# Patient Record
Sex: MALE | Race: OTHER | HISPANIC OR LATINO | URBAN - METROPOLITAN AREA
[De-identification: names, ages, dates, MRNs, and addresses within clinical notes are randomized per-mention and may not be internally consistent; named-entity substitution may affect disease eponyms.]

---

## 2020-02-07 ENCOUNTER — INPATIENT (INPATIENT)
Facility: HOSPITAL | Age: 45
LOS: 2 days | Discharge: HOME | End: 2020-02-10
Attending: SURGERY | Admitting: SURGERY
Payer: MEDICAID

## 2020-02-07 VITALS
SYSTOLIC BLOOD PRESSURE: 175 MMHG | OXYGEN SATURATION: 98 % | RESPIRATION RATE: 18 BRPM | DIASTOLIC BLOOD PRESSURE: 89 MMHG | HEART RATE: 75 BPM | TEMPERATURE: 100 F

## 2020-02-07 LAB
ALBUMIN SERPL ELPH-MCNC: 4.7 G/DL — SIGNIFICANT CHANGE UP (ref 3.5–5.2)
ALP SERPL-CCNC: 92 U/L — SIGNIFICANT CHANGE UP (ref 30–115)
ALT FLD-CCNC: 54 U/L — HIGH (ref 0–41)
ANION GAP SERPL CALC-SCNC: 13 MMOL/L — SIGNIFICANT CHANGE UP (ref 7–14)
APPEARANCE UR: CLEAR — SIGNIFICANT CHANGE UP
APTT BLD: 36.1 SEC — SIGNIFICANT CHANGE UP (ref 27–39.2)
AST SERPL-CCNC: 31 U/L — SIGNIFICANT CHANGE UP (ref 0–41)
BACTERIA # UR AUTO: ABNORMAL
BASOPHILS # BLD AUTO: 0.05 K/UL — SIGNIFICANT CHANGE UP (ref 0–0.2)
BASOPHILS NFR BLD AUTO: 0.5 % — SIGNIFICANT CHANGE UP (ref 0–1)
BILIRUB DIRECT SERPL-MCNC: <0.2 MG/DL — SIGNIFICANT CHANGE UP (ref 0–0.2)
BILIRUB INDIRECT FLD-MCNC: >0.3 MG/DL — SIGNIFICANT CHANGE UP (ref 0.2–1.2)
BILIRUB SERPL-MCNC: 0.5 MG/DL — SIGNIFICANT CHANGE UP (ref 0.2–1.2)
BILIRUB UR-MCNC: NEGATIVE — SIGNIFICANT CHANGE UP
BUN SERPL-MCNC: 13 MG/DL — SIGNIFICANT CHANGE UP (ref 10–20)
CALCIUM SERPL-MCNC: 9.6 MG/DL — SIGNIFICANT CHANGE UP (ref 8.5–10.1)
CHLORIDE SERPL-SCNC: 101 MMOL/L — SIGNIFICANT CHANGE UP (ref 98–110)
CO2 SERPL-SCNC: 25 MMOL/L — SIGNIFICANT CHANGE UP (ref 17–32)
COLOR SPEC: YELLOW — SIGNIFICANT CHANGE UP
CREAT SERPL-MCNC: 0.6 MG/DL — LOW (ref 0.7–1.5)
DIFF PNL FLD: NEGATIVE — SIGNIFICANT CHANGE UP
EOSINOPHIL # BLD AUTO: 0.29 K/UL — SIGNIFICANT CHANGE UP (ref 0–0.7)
EOSINOPHIL NFR BLD AUTO: 2.6 % — SIGNIFICANT CHANGE UP (ref 0–8)
GLUCOSE SERPL-MCNC: 134 MG/DL — HIGH (ref 70–99)
GLUCOSE UR QL: NEGATIVE — SIGNIFICANT CHANGE UP
HCT VFR BLD CALC: 42.3 % — SIGNIFICANT CHANGE UP (ref 42–52)
HGB BLD-MCNC: 14.4 G/DL — SIGNIFICANT CHANGE UP (ref 14–18)
IMM GRANULOCYTES NFR BLD AUTO: 0.2 % — SIGNIFICANT CHANGE UP (ref 0.1–0.3)
INR BLD: 1.05 RATIO — SIGNIFICANT CHANGE UP (ref 0.65–1.3)
KETONES UR-MCNC: ABNORMAL
LACTATE SERPL-SCNC: 1.2 MMOL/L — SIGNIFICANT CHANGE UP (ref 0.7–2)
LEUKOCYTE ESTERASE UR-ACNC: NEGATIVE — SIGNIFICANT CHANGE UP
LIDOCAIN IGE QN: 43 U/L — SIGNIFICANT CHANGE UP (ref 7–60)
LYMPHOCYTES # BLD AUTO: 3.65 K/UL — HIGH (ref 1.2–3.4)
LYMPHOCYTES # BLD AUTO: 33.2 % — SIGNIFICANT CHANGE UP (ref 20.5–51.1)
MCHC RBC-ENTMCNC: 32.4 PG — HIGH (ref 27–31)
MCHC RBC-ENTMCNC: 34 G/DL — SIGNIFICANT CHANGE UP (ref 32–37)
MCV RBC AUTO: 95.1 FL — HIGH (ref 80–94)
MONOCYTES # BLD AUTO: 0.7 K/UL — HIGH (ref 0.1–0.6)
MONOCYTES NFR BLD AUTO: 6.4 % — SIGNIFICANT CHANGE UP (ref 1.7–9.3)
NEUTROPHILS # BLD AUTO: 6.29 K/UL — SIGNIFICANT CHANGE UP (ref 1.4–6.5)
NEUTROPHILS NFR BLD AUTO: 57.1 % — SIGNIFICANT CHANGE UP (ref 42.2–75.2)
NITRITE UR-MCNC: NEGATIVE — SIGNIFICANT CHANGE UP
NRBC # BLD: 0 /100 WBCS — SIGNIFICANT CHANGE UP (ref 0–0)
PH UR: 5.5 — SIGNIFICANT CHANGE UP (ref 5–8)
PLATELET # BLD AUTO: 244 K/UL — SIGNIFICANT CHANGE UP (ref 130–400)
POTASSIUM SERPL-MCNC: 4.4 MMOL/L — SIGNIFICANT CHANGE UP (ref 3.5–5)
POTASSIUM SERPL-SCNC: 4.4 MMOL/L — SIGNIFICANT CHANGE UP (ref 3.5–5)
PROT SERPL-MCNC: 8 G/DL — SIGNIFICANT CHANGE UP (ref 6–8)
PROT UR-MCNC: ABNORMAL
PROTHROM AB SERPL-ACNC: 12.1 SEC — SIGNIFICANT CHANGE UP (ref 9.95–12.87)
RBC # BLD: 4.45 M/UL — LOW (ref 4.7–6.1)
RBC # FLD: 12 % — SIGNIFICANT CHANGE UP (ref 11.5–14.5)
SODIUM SERPL-SCNC: 139 MMOL/L — SIGNIFICANT CHANGE UP (ref 135–146)
SP GR SPEC: 1.02 — SIGNIFICANT CHANGE UP (ref 1.01–1.03)
UROBILINOGEN FLD QL: 0.2 — SIGNIFICANT CHANGE UP (ref 0.2–0.2)
WBC # BLD: 11 K/UL — HIGH (ref 4.8–10.8)
WBC # FLD AUTO: 11 K/UL — HIGH (ref 4.8–10.8)

## 2020-02-07 PROCEDURE — 76705 ECHO EXAM OF ABDOMEN: CPT | Mod: 26

## 2020-02-07 PROCEDURE — 74177 CT ABD & PELVIS W/CONTRAST: CPT | Mod: 26

## 2020-02-07 PROCEDURE — 99285 EMERGENCY DEPT VISIT HI MDM: CPT

## 2020-02-07 PROCEDURE — 93010 ELECTROCARDIOGRAM REPORT: CPT

## 2020-02-07 RX ORDER — SODIUM CHLORIDE 9 MG/ML
1000 INJECTION INTRAMUSCULAR; INTRAVENOUS; SUBCUTANEOUS ONCE
Refills: 0 | Status: COMPLETED | OUTPATIENT
Start: 2020-02-07 | End: 2020-02-07

## 2020-02-07 RX ORDER — MORPHINE SULFATE 50 MG/1
4 CAPSULE, EXTENDED RELEASE ORAL ONCE
Refills: 0 | Status: DISCONTINUED | OUTPATIENT
Start: 2020-02-07 | End: 2020-02-07

## 2020-02-07 RX ORDER — MORPHINE SULFATE 50 MG/1
6 CAPSULE, EXTENDED RELEASE ORAL ONCE
Refills: 0 | Status: DISCONTINUED | OUTPATIENT
Start: 2020-02-07 | End: 2020-02-07

## 2020-02-07 RX ORDER — METRONIDAZOLE 500 MG
500 TABLET ORAL ONCE
Refills: 0 | Status: COMPLETED | OUTPATIENT
Start: 2020-02-07 | End: 2020-02-07

## 2020-02-07 RX ORDER — ONDANSETRON 8 MG/1
4 TABLET, FILM COATED ORAL ONCE
Refills: 0 | Status: COMPLETED | OUTPATIENT
Start: 2020-02-07 | End: 2020-02-07

## 2020-02-07 RX ORDER — CEFTRIAXONE 500 MG/1
1000 INJECTION, POWDER, FOR SOLUTION INTRAMUSCULAR; INTRAVENOUS ONCE
Refills: 0 | Status: COMPLETED | OUTPATIENT
Start: 2020-02-07 | End: 2020-02-07

## 2020-02-07 RX ADMIN — SODIUM CHLORIDE 1000 MILLILITER(S): 9 INJECTION INTRAMUSCULAR; INTRAVENOUS; SUBCUTANEOUS at 19:15

## 2020-02-07 RX ADMIN — Medication 100 MILLIGRAM(S): at 22:11

## 2020-02-07 RX ADMIN — CEFTRIAXONE 100 MILLIGRAM(S): 500 INJECTION, POWDER, FOR SOLUTION INTRAMUSCULAR; INTRAVENOUS at 23:49

## 2020-02-07 RX ADMIN — MORPHINE SULFATE 6 MILLIGRAM(S): 50 CAPSULE, EXTENDED RELEASE ORAL at 21:49

## 2020-02-07 RX ADMIN — ONDANSETRON 4 MILLIGRAM(S): 8 TABLET, FILM COATED ORAL at 19:15

## 2020-02-07 RX ADMIN — MORPHINE SULFATE 4 MILLIGRAM(S): 50 CAPSULE, EXTENDED RELEASE ORAL at 19:15

## 2020-02-07 NOTE — ED PROVIDER NOTE - PROGRESS NOTE DETAILS
I, Dr. Astorga, received signout from Dr. Miguel.  Pending US read for RUQ pain. US read returns showing signs concerning for acute cholecystitis. Contacting surgery Spoke with surgery team numerous times, will awaiting official plan/disposition. AS per surgery team, admit to Dr. Hung surgery floor.

## 2020-02-07 NOTE — ED PROVIDER NOTE - NS ED ROS FT
Constitutional: no fever, chills or generalized weakness  Eyes: no visual changes, no eye pain  ENT: no URI sxs, no throat pain, no change in voice, no excessive drooling, no ear pain  Cardiovascular: no chest pain, no sob, no syncope , no palpitations,  Respiratory: no cough, no shortness of breath  Gastrointestinal: see hpi.   : no urinary sxs, no flank pain.  Musculoskeletal: no msk pain or injury.  Integumentary: no rash or skin changes. no edema  Neurological: no headache, no dizziness, no visual changes, no UE/LE weakness or paresthesias.  Psychiatric: no suicidal or homicidal ideation or attempt. no hallucinations.   Allergic/Immunologic: no lymphadenopathy, no pruritus

## 2020-02-07 NOTE — ED PROVIDER NOTE - ATTENDING CONTRIBUTION TO CARE
45 yo m with no pmh ,presents with 4 days of ruq pain.  pt denies back pain.  no urinary sx.  +n/v.  no diarrhea.  no fever but has chills.  no cp, no sob.  exam: nad, ncat, perrl, eomi, mmm, rrr, ctab, abd soft, ttp ruq, no rebound, mild guarding, nd aox3, imp: pt with ruq pain, labs, us

## 2020-02-07 NOTE — ED PROVIDER NOTE - PHYSICAL EXAMINATION
GENERAL:  well appearing, non-toxic male in no acute distress  SKIN: skin warm, pink and dry. MMM. no rash to abdomen. cap refill < 2 sec  HEAD: NC, AT  EYE: Normal lids, conjunctiva and sclera, PERRL, EOMI  ENT:  Airway intact. Patent oropharynx  NECK: Neck supple. No meningismus, or JVD. Trachea midline  PULM: CTAB. Normal respiratory effort. No respiratory distress. No wheezes, stridor, rales or rhonchi. No retractions  CV: RRR, no M/R/G.   ABD: Soft, non-distended. + RUQ tenderness, + aaron sign.  No rebound or guarding. No CVA tenderness.  MSK: moving all extremities. No edema, erythema, cyanosis. radial pulses equal and intact bilaterally.   NEURO: A+Ox3, no sensory/motor deficits  PSYCH: appropriate behavior, cooperative.

## 2020-02-07 NOTE — ED PROVIDER NOTE - OBJECTIVE STATEMENT
43 yo male with no significant pmh presents to the ED c/o RUQ abd pain x 4 days. Associated with nausea and vomiting. Pain is sharp, constant, nonradiating. no prior abd surgery. Denies fever, chills, chest pain, sob, diarrhea, urinary sxs, flank pain. No alleviating or aggregating factors.

## 2020-02-07 NOTE — ED PROVIDER NOTE - CLINICAL SUMMARY MEDICAL DECISION MAKING FREE TEXT BOX
pw ruq abd pain. Found to have acute cholecystitis. Surgery evaluated. Abx given. Admitted to Dr. Hung.

## 2020-02-07 NOTE — ED ADULT NURSE NOTE - NSIMPLEMENTINTERV_GEN_ALL_ED
Implemented All Universal Safety Interventions:  [place] to call system. Call bell, personal items and telephone within reach. Instruct patient to call for assistance. Room bathroom lighting operational. Non-slip footwear when patient is off stretcher. Physically safe environment: no spills, clutter or unnecessary equipment. Stretcher in lowest position, wheels locked, appropriate side rails in place.

## 2020-02-08 DIAGNOSIS — Z98.890 OTHER SPECIFIED POSTPROCEDURAL STATES: Chronic | ICD-10-CM

## 2020-02-08 LAB
ALBUMIN SERPL ELPH-MCNC: 4.2 G/DL — SIGNIFICANT CHANGE UP (ref 3.5–5.2)
ALBUMIN SERPL ELPH-MCNC: 4.7 G/DL — SIGNIFICANT CHANGE UP (ref 3.5–5.2)
ALP SERPL-CCNC: 71 U/L — SIGNIFICANT CHANGE UP (ref 30–115)
ALP SERPL-CCNC: 82 U/L — SIGNIFICANT CHANGE UP (ref 30–115)
ALT FLD-CCNC: 47 U/L — HIGH (ref 0–41)
ALT FLD-CCNC: 53 U/L — HIGH (ref 0–41)
ANION GAP SERPL CALC-SCNC: 12 MMOL/L — SIGNIFICANT CHANGE UP (ref 7–14)
ANION GAP SERPL CALC-SCNC: 12 MMOL/L — SIGNIFICANT CHANGE UP (ref 7–14)
AST SERPL-CCNC: 30 U/L — SIGNIFICANT CHANGE UP (ref 0–41)
AST SERPL-CCNC: 33 U/L — SIGNIFICANT CHANGE UP (ref 0–41)
BASOPHILS # BLD AUTO: 0.04 K/UL — SIGNIFICANT CHANGE UP (ref 0–0.2)
BASOPHILS # BLD AUTO: 0.04 K/UL — SIGNIFICANT CHANGE UP (ref 0–0.2)
BASOPHILS NFR BLD AUTO: 0.5 % — SIGNIFICANT CHANGE UP (ref 0–1)
BASOPHILS NFR BLD AUTO: 0.7 % — SIGNIFICANT CHANGE UP (ref 0–1)
BILIRUB DIRECT SERPL-MCNC: 0.2 MG/DL — SIGNIFICANT CHANGE UP (ref 0–0.2)
BILIRUB DIRECT SERPL-MCNC: 0.2 MG/DL — SIGNIFICANT CHANGE UP (ref 0–0.2)
BILIRUB INDIRECT FLD-MCNC: 0.5 MG/DL — SIGNIFICANT CHANGE UP (ref 0.2–1.2)
BILIRUB INDIRECT FLD-MCNC: 0.7 MG/DL — SIGNIFICANT CHANGE UP (ref 0.2–1.2)
BILIRUB SERPL-MCNC: 0.7 MG/DL — SIGNIFICANT CHANGE UP (ref 0.2–1.2)
BILIRUB SERPL-MCNC: 0.9 MG/DL — SIGNIFICANT CHANGE UP (ref 0.2–1.2)
BLD GP AB SCN SERPL QL: SIGNIFICANT CHANGE UP
BUN SERPL-MCNC: 10 MG/DL — SIGNIFICANT CHANGE UP (ref 10–20)
BUN SERPL-MCNC: 8 MG/DL — LOW (ref 10–20)
CALCIUM SERPL-MCNC: 9 MG/DL — SIGNIFICANT CHANGE UP (ref 8.5–10.1)
CALCIUM SERPL-MCNC: 9.4 MG/DL — SIGNIFICANT CHANGE UP (ref 8.5–10.1)
CHLORIDE SERPL-SCNC: 101 MMOL/L — SIGNIFICANT CHANGE UP (ref 98–110)
CHLORIDE SERPL-SCNC: 102 MMOL/L — SIGNIFICANT CHANGE UP (ref 98–110)
CO2 SERPL-SCNC: 26 MMOL/L — SIGNIFICANT CHANGE UP (ref 17–32)
CO2 SERPL-SCNC: 27 MMOL/L — SIGNIFICANT CHANGE UP (ref 17–32)
CREAT SERPL-MCNC: 0.5 MG/DL — LOW (ref 0.7–1.5)
CREAT SERPL-MCNC: 0.6 MG/DL — LOW (ref 0.7–1.5)
EOSINOPHIL # BLD AUTO: 0.23 K/UL — SIGNIFICANT CHANGE UP (ref 0–0.7)
EOSINOPHIL # BLD AUTO: 0.35 K/UL — SIGNIFICANT CHANGE UP (ref 0–0.7)
EOSINOPHIL NFR BLD AUTO: 2.7 % — SIGNIFICANT CHANGE UP (ref 0–8)
EOSINOPHIL NFR BLD AUTO: 6 % — SIGNIFICANT CHANGE UP (ref 0–8)
GLUCOSE SERPL-MCNC: 111 MG/DL — HIGH (ref 70–99)
GLUCOSE SERPL-MCNC: 129 MG/DL — HIGH (ref 70–99)
HCT VFR BLD CALC: 40.1 % — LOW (ref 42–52)
HCT VFR BLD CALC: 42 % — SIGNIFICANT CHANGE UP (ref 42–52)
HGB BLD-MCNC: 13.8 G/DL — LOW (ref 14–18)
HGB BLD-MCNC: 14.4 G/DL — SIGNIFICANT CHANGE UP (ref 14–18)
IMM GRANULOCYTES NFR BLD AUTO: 0.2 % — SIGNIFICANT CHANGE UP (ref 0.1–0.3)
IMM GRANULOCYTES NFR BLD AUTO: 0.2 % — SIGNIFICANT CHANGE UP (ref 0.1–0.3)
LYMPHOCYTES # BLD AUTO: 2.48 K/UL — SIGNIFICANT CHANGE UP (ref 1.2–3.4)
LYMPHOCYTES # BLD AUTO: 2.67 K/UL — SIGNIFICANT CHANGE UP (ref 1.2–3.4)
LYMPHOCYTES # BLD AUTO: 28.8 % — SIGNIFICANT CHANGE UP (ref 20.5–51.1)
LYMPHOCYTES # BLD AUTO: 45.6 % — SIGNIFICANT CHANGE UP (ref 20.5–51.1)
MAGNESIUM SERPL-MCNC: 2 MG/DL — SIGNIFICANT CHANGE UP (ref 1.8–2.4)
MAGNESIUM SERPL-MCNC: 2.1 MG/DL — SIGNIFICANT CHANGE UP (ref 1.8–2.4)
MCHC RBC-ENTMCNC: 32.2 PG — HIGH (ref 27–31)
MCHC RBC-ENTMCNC: 32.4 PG — HIGH (ref 27–31)
MCHC RBC-ENTMCNC: 34.3 G/DL — SIGNIFICANT CHANGE UP (ref 32–37)
MCHC RBC-ENTMCNC: 34.4 G/DL — SIGNIFICANT CHANGE UP (ref 32–37)
MCV RBC AUTO: 93.5 FL — SIGNIFICANT CHANGE UP (ref 80–94)
MCV RBC AUTO: 94.6 FL — HIGH (ref 80–94)
MONOCYTES # BLD AUTO: 0.48 K/UL — SIGNIFICANT CHANGE UP (ref 0.1–0.6)
MONOCYTES # BLD AUTO: 0.56 K/UL — SIGNIFICANT CHANGE UP (ref 0.1–0.6)
MONOCYTES NFR BLD AUTO: 6.5 % — SIGNIFICANT CHANGE UP (ref 1.7–9.3)
MONOCYTES NFR BLD AUTO: 8.2 % — SIGNIFICANT CHANGE UP (ref 1.7–9.3)
NEUTROPHILS # BLD AUTO: 2.3 K/UL — SIGNIFICANT CHANGE UP (ref 1.4–6.5)
NEUTROPHILS # BLD AUTO: 5.29 K/UL — SIGNIFICANT CHANGE UP (ref 1.4–6.5)
NEUTROPHILS NFR BLD AUTO: 39.3 % — LOW (ref 42.2–75.2)
NEUTROPHILS NFR BLD AUTO: 61.3 % — SIGNIFICANT CHANGE UP (ref 42.2–75.2)
NRBC # BLD: 0 /100 WBCS — SIGNIFICANT CHANGE UP (ref 0–0)
NRBC # BLD: 0 /100 WBCS — SIGNIFICANT CHANGE UP (ref 0–0)
PHOSPHATE SERPL-MCNC: 4.5 MG/DL — SIGNIFICANT CHANGE UP (ref 2.1–4.9)
PHOSPHATE SERPL-MCNC: 4.7 MG/DL — SIGNIFICANT CHANGE UP (ref 2.1–4.9)
PLATELET # BLD AUTO: 222 K/UL — SIGNIFICANT CHANGE UP (ref 130–400)
PLATELET # BLD AUTO: 226 K/UL — SIGNIFICANT CHANGE UP (ref 130–400)
POTASSIUM SERPL-MCNC: 4.2 MMOL/L — SIGNIFICANT CHANGE UP (ref 3.5–5)
POTASSIUM SERPL-MCNC: 4.9 MMOL/L — SIGNIFICANT CHANGE UP (ref 3.5–5)
POTASSIUM SERPL-SCNC: 4.2 MMOL/L — SIGNIFICANT CHANGE UP (ref 3.5–5)
POTASSIUM SERPL-SCNC: 4.9 MMOL/L — SIGNIFICANT CHANGE UP (ref 3.5–5)
PROT SERPL-MCNC: 6.8 G/DL — SIGNIFICANT CHANGE UP (ref 6–8)
PROT SERPL-MCNC: 7.8 G/DL — SIGNIFICANT CHANGE UP (ref 6–8)
RBC # BLD: 4.29 M/UL — LOW (ref 4.7–6.1)
RBC # BLD: 4.44 M/UL — LOW (ref 4.7–6.1)
RBC # FLD: 11.9 % — SIGNIFICANT CHANGE UP (ref 11.5–14.5)
RBC # FLD: 12.2 % — SIGNIFICANT CHANGE UP (ref 11.5–14.5)
SODIUM SERPL-SCNC: 140 MMOL/L — SIGNIFICANT CHANGE UP (ref 135–146)
SODIUM SERPL-SCNC: 140 MMOL/L — SIGNIFICANT CHANGE UP (ref 135–146)
WBC # BLD: 5.85 K/UL — SIGNIFICANT CHANGE UP (ref 4.8–10.8)
WBC # BLD: 8.62 K/UL — SIGNIFICANT CHANGE UP (ref 4.8–10.8)
WBC # FLD AUTO: 5.85 K/UL — SIGNIFICANT CHANGE UP (ref 4.8–10.8)
WBC # FLD AUTO: 8.62 K/UL — SIGNIFICANT CHANGE UP (ref 4.8–10.8)

## 2020-02-08 PROCEDURE — 71045 X-RAY EXAM CHEST 1 VIEW: CPT | Mod: 26

## 2020-02-08 PROCEDURE — 99222 1ST HOSP IP/OBS MODERATE 55: CPT

## 2020-02-08 RX ORDER — AMPICILLIN SODIUM AND SULBACTAM SODIUM 250; 125 MG/ML; MG/ML
INJECTION, POWDER, FOR SUSPENSION INTRAMUSCULAR; INTRAVENOUS
Refills: 0 | Status: DISCONTINUED | OUTPATIENT
Start: 2020-02-08 | End: 2020-02-09

## 2020-02-08 RX ORDER — MORPHINE SULFATE 50 MG/1
2 CAPSULE, EXTENDED RELEASE ORAL EVERY 6 HOURS
Refills: 0 | Status: DISCONTINUED | OUTPATIENT
Start: 2020-02-08 | End: 2020-02-09

## 2020-02-08 RX ORDER — AMPICILLIN SODIUM AND SULBACTAM SODIUM 250; 125 MG/ML; MG/ML
3 INJECTION, POWDER, FOR SUSPENSION INTRAMUSCULAR; INTRAVENOUS EVERY 6 HOURS
Refills: 0 | Status: DISCONTINUED | OUTPATIENT
Start: 2020-02-08 | End: 2020-02-09

## 2020-02-08 RX ORDER — INFLUENZA VIRUS VACCINE 15; 15; 15; 15 UG/.5ML; UG/.5ML; UG/.5ML; UG/.5ML
0.5 SUSPENSION INTRAMUSCULAR ONCE
Refills: 0 | Status: DISCONTINUED | OUTPATIENT
Start: 2020-02-08 | End: 2020-02-10

## 2020-02-08 RX ORDER — AMPICILLIN SODIUM AND SULBACTAM SODIUM 250; 125 MG/ML; MG/ML
3 INJECTION, POWDER, FOR SUSPENSION INTRAMUSCULAR; INTRAVENOUS ONCE
Refills: 0 | Status: COMPLETED | OUTPATIENT
Start: 2020-02-08 | End: 2020-02-08

## 2020-02-08 RX ORDER — HEPARIN SODIUM 5000 [USP'U]/ML
5000 INJECTION INTRAVENOUS; SUBCUTANEOUS EVERY 8 HOURS
Refills: 0 | Status: DISCONTINUED | OUTPATIENT
Start: 2020-02-08 | End: 2020-02-09

## 2020-02-08 RX ORDER — HYDROMORPHONE HYDROCHLORIDE 2 MG/ML
0.5 INJECTION INTRAMUSCULAR; INTRAVENOUS; SUBCUTANEOUS ONCE
Refills: 0 | Status: DISCONTINUED | OUTPATIENT
Start: 2020-02-08 | End: 2020-02-08

## 2020-02-08 RX ORDER — SODIUM CHLORIDE 9 MG/ML
1000 INJECTION INTRAMUSCULAR; INTRAVENOUS; SUBCUTANEOUS
Refills: 0 | Status: DISCONTINUED | OUTPATIENT
Start: 2020-02-08 | End: 2020-02-09

## 2020-02-08 RX ORDER — PANTOPRAZOLE SODIUM 20 MG/1
40 TABLET, DELAYED RELEASE ORAL DAILY
Refills: 0 | Status: DISCONTINUED | OUTPATIENT
Start: 2020-02-08 | End: 2020-02-09

## 2020-02-08 RX ADMIN — HEPARIN SODIUM 5000 UNIT(S): 5000 INJECTION INTRAVENOUS; SUBCUTANEOUS at 13:12

## 2020-02-08 RX ADMIN — HYDROMORPHONE HYDROCHLORIDE 0.5 MILLIGRAM(S): 2 INJECTION INTRAMUSCULAR; INTRAVENOUS; SUBCUTANEOUS at 20:07

## 2020-02-08 RX ADMIN — PANTOPRAZOLE SODIUM 40 MILLIGRAM(S): 20 TABLET, DELAYED RELEASE ORAL at 12:25

## 2020-02-08 RX ADMIN — MORPHINE SULFATE 2 MILLIGRAM(S): 50 CAPSULE, EXTENDED RELEASE ORAL at 18:14

## 2020-02-08 RX ADMIN — AMPICILLIN SODIUM AND SULBACTAM SODIUM 200 GRAM(S): 250; 125 INJECTION, POWDER, FOR SUSPENSION INTRAMUSCULAR; INTRAVENOUS at 03:05

## 2020-02-08 RX ADMIN — AMPICILLIN SODIUM AND SULBACTAM SODIUM 200 GRAM(S): 250; 125 INJECTION, POWDER, FOR SUSPENSION INTRAMUSCULAR; INTRAVENOUS at 17:56

## 2020-02-08 RX ADMIN — HEPARIN SODIUM 5000 UNIT(S): 5000 INJECTION INTRAVENOUS; SUBCUTANEOUS at 22:44

## 2020-02-08 RX ADMIN — HYDROMORPHONE HYDROCHLORIDE 0.5 MILLIGRAM(S): 2 INJECTION INTRAMUSCULAR; INTRAVENOUS; SUBCUTANEOUS at 19:56

## 2020-02-08 RX ADMIN — AMPICILLIN SODIUM AND SULBACTAM SODIUM 200 GRAM(S): 250; 125 INJECTION, POWDER, FOR SUSPENSION INTRAMUSCULAR; INTRAVENOUS at 12:25

## 2020-02-08 RX ADMIN — HEPARIN SODIUM 5000 UNIT(S): 5000 INJECTION INTRAVENOUS; SUBCUTANEOUS at 06:17

## 2020-02-08 RX ADMIN — AMPICILLIN SODIUM AND SULBACTAM SODIUM 200 GRAM(S): 250; 125 INJECTION, POWDER, FOR SUSPENSION INTRAMUSCULAR; INTRAVENOUS at 06:17

## 2020-02-08 RX ADMIN — MORPHINE SULFATE 2 MILLIGRAM(S): 50 CAPSULE, EXTENDED RELEASE ORAL at 17:59

## 2020-02-08 RX ADMIN — SODIUM CHLORIDE 100 MILLILITER(S): 9 INJECTION INTRAMUSCULAR; INTRAVENOUS; SUBCUTANEOUS at 03:06

## 2020-02-08 NOTE — H&P ADULT - NSHPLABSRESULTS_GEN_ALL_CORE
14.4   11.00 )-----------( 244      (  @ 18:45 )             42.3                    139   |  101   |  13                 Ca: 9.6    BMP:   ----------------------------< 134    Mg: x     (20 @ 18:45)             4.4    |  25    | 0.6                Ph: x        LFT:     TPro: 8.0 / Alb: 4.7 / TBili: 0.5 / DBili: <0.2 / AST: 31 / ALT: 54 / AlkPhos: 92   (20 @ 18:45)          PT/INR - ( 2020 23:07 )   PT: 12.10 sec;   INR: 1.05 ratio         PTT - ( 2020 23:07 )  PTT:36.1 sec          Urinalysis Basic - ( 2020 20:49 )    Color: Yellow / Appearance: Clear / S.020 / pH: x  Gluc: x / Ketone: Trace  / Bili: Negative / Urobili: 0.2   Blood: x / Protein: Trace / Nitrite: Negative   Leuk Esterase: Negative / RBC: x / WBC x   Sq Epi: x / Non Sq Epi: x / Bacteria: Few      RADIOLOGY:   US abdomen  IMPRESSION:  Distended gallbladder, gallbladder sludge with trace pericholecystic fluid, and positive sonographic Lux's sign. Findings are suspicious for acute cholecystitis.     Increased hepatic echogenicity, consistent with hepatic steatosis or hepatocellular disease.  < end of copied text >      < from: CT Abdomen and Pelvis w/ IV Cont (20 @ 22:39) >  IMPRESSION:   1. Inflammatory changes of the distended gallbladder suggestive of acute cholecystitis.  2. Hepatic steatosis.  < end of copied text >

## 2020-02-08 NOTE — H&P ADULT - NSHPPHYSICALEXAM_GEN_ALL_CORE
PHYSICAL EXAM:  GENERAL: NAD,  CHEST/LUNG: Bilateral breath sounds  HEART: Regular rate and rhythm  ABDOMEN: Soft, Nondistended, TTP over RUQ, +guarding PHYSICAL EXAM:  GENERAL: Obese built male in some discomfort, Old healed scar left face  CHEST/LUNG: Bilateral breath sounds  HEART: Regular rate and rhythm  ABDOMEN: Soft, Nondistended, TTP over RUQ, +guarding

## 2020-02-08 NOTE — H&P ADULT - HISTORY OF PRESENT ILLNESS
44M, PMHx of left sided thoracotomy, presents to ED w/ abdominal pain. Pt states he has been experiencing RUQ abdominal pain for the past 4 days. Pt admits to nausea and vomiting starting today. Admits to chills. Pt denies chills, dysuria, constipation, or diarrhea. Pt states he experienced mild abdominal pain in the past, never this severe.    ID #

## 2020-02-08 NOTE — H&P ADULT - ASSESSMENT
ASSESSMENT/PLAN:   44M, PMHx Left sided thoracotomy, presents w/ 4 days of RUQ abdominal pain, associated w/ 1 day of nausea/vomiting, +chills, -fevers. US showed distended gallbladder, sludge w/ trace pericholecystic fluid. CTAP showed inflammatory changes of distended gallbladder, suggestive of acute cholecystitis. Pt is TTP over RUQ. WBC 11.   - Admit to surgery   - Plan for OR for laparoscopic cholecystectomy, possible open   - Consent obtained, in chart   - NPO   - IVF   - IV ABX : Unasyn   - Pain control ASSESSMENT/PLAN:   44M, PMHx Left sided thoracotomy, presents w/ 4 days of RUQ abdominal pain, associated w/ 1 day of nausea/vomiting, +chills, -fevers. US showed distended gallbladder, sludge w/ trace pericholecystic fluid. CTAP showed inflammatory changes of distended gallbladder, suggestive of acute cholecystitis. Pt is TTP over RUQ. WBC 11.   - Admit to surgery   - Plan for OR for laparoscopic cholecystectomy, possible open   - Consent obtained, in chart   - NPO   - IVF   - IV ABX : Unasyn   - Pain control     ***The following information was acquired through the use of [employer] services, Language : Mongolian,  ID#: _____ . All questions and concerns were addressed and the plans/findings were communicated to the patient who voiced their understanding at this time.***    Senior Surgical Resident Note  I have edited the above note and agree with the current treatment plan  Above plan was discussed with Dr. Hung , patient, patient's family present at bedside, and the ED team  ---------------------------------------------------------------------------------------  02-08-20 @ 02:32

## 2020-02-09 ENCOUNTER — TRANSCRIPTION ENCOUNTER (OUTPATIENT)
Age: 45
End: 2020-02-09

## 2020-02-09 ENCOUNTER — RESULT REVIEW (OUTPATIENT)
Age: 45
End: 2020-02-09

## 2020-02-09 LAB
BASE EXCESS BLDA CALC-SCNC: 1.5 MMOL/L — SIGNIFICANT CHANGE UP (ref -2–2)
BLD GP AB SCN SERPL QL: SIGNIFICANT CHANGE UP
GAS PNL BLDA: SIGNIFICANT CHANGE UP
HCO3 BLDA-SCNC: 26 MMOL/L — SIGNIFICANT CHANGE UP (ref 23–27)
HOROWITZ INDEX BLDA+IHG-RTO: 21 — SIGNIFICANT CHANGE UP
PCO2 BLDA: 42 MMHG — SIGNIFICANT CHANGE UP (ref 38–42)
PH BLDA: 7.41 — SIGNIFICANT CHANGE UP (ref 7.38–7.42)
PO2 BLDA: 62 MMHG — LOW (ref 78–95)
SAO2 % BLDA: 92 % — LOW (ref 94–98)

## 2020-02-09 PROCEDURE — 88304 TISSUE EXAM BY PATHOLOGIST: CPT | Mod: 26

## 2020-02-09 PROCEDURE — 47562 LAPAROSCOPIC CHOLECYSTECTOMY: CPT

## 2020-02-09 PROCEDURE — 99232 SBSQ HOSP IP/OBS MODERATE 35: CPT | Mod: 57

## 2020-02-09 PROCEDURE — 93010 ELECTROCARDIOGRAM REPORT: CPT

## 2020-02-09 RX ORDER — KETOROLAC TROMETHAMINE 30 MG/ML
15 SYRINGE (ML) INJECTION EVERY 8 HOURS
Refills: 0 | Status: DISCONTINUED | OUTPATIENT
Start: 2020-02-09 | End: 2020-02-10

## 2020-02-09 RX ORDER — SODIUM CHLORIDE 9 MG/ML
1000 INJECTION, SOLUTION INTRAVENOUS
Refills: 0 | Status: DISCONTINUED | OUTPATIENT
Start: 2020-02-09 | End: 2020-02-09

## 2020-02-09 RX ORDER — BUPIVACAINE 13.3 MG/ML
20 INJECTION, SUSPENSION, LIPOSOMAL INFILTRATION ONCE
Refills: 0 | Status: DISCONTINUED | OUTPATIENT
Start: 2020-02-09 | End: 2020-02-09

## 2020-02-09 RX ORDER — FAMOTIDINE 10 MG/ML
20 INJECTION INTRAVENOUS EVERY 12 HOURS
Refills: 0 | Status: DISCONTINUED | OUTPATIENT
Start: 2020-02-09 | End: 2020-02-10

## 2020-02-09 RX ORDER — OXYCODONE HYDROCHLORIDE 5 MG/1
1 TABLET ORAL
Qty: 10 | Refills: 0
Start: 2020-02-09 | End: 2020-02-11

## 2020-02-09 RX ORDER — HYDROMORPHONE HYDROCHLORIDE 2 MG/ML
0.5 INJECTION INTRAMUSCULAR; INTRAVENOUS; SUBCUTANEOUS
Refills: 0 | Status: DISCONTINUED | OUTPATIENT
Start: 2020-02-09 | End: 2020-02-09

## 2020-02-09 RX ORDER — OXYCODONE AND ACETAMINOPHEN 5; 325 MG/1; MG/1
1 TABLET ORAL EVERY 4 HOURS
Refills: 0 | Status: DISCONTINUED | OUTPATIENT
Start: 2020-02-09 | End: 2020-02-10

## 2020-02-09 RX ORDER — ONDANSETRON 8 MG/1
4 TABLET, FILM COATED ORAL ONCE
Refills: 0 | Status: DISCONTINUED | OUTPATIENT
Start: 2020-02-09 | End: 2020-02-09

## 2020-02-09 RX ORDER — ENOXAPARIN SODIUM 100 MG/ML
40 INJECTION SUBCUTANEOUS DAILY
Refills: 0 | Status: DISCONTINUED | OUTPATIENT
Start: 2020-02-09 | End: 2020-02-10

## 2020-02-09 RX ORDER — ONDANSETRON 8 MG/1
4 TABLET, FILM COATED ORAL EVERY 6 HOURS
Refills: 0 | Status: DISCONTINUED | OUTPATIENT
Start: 2020-02-09 | End: 2020-02-10

## 2020-02-09 RX ORDER — HYDROMORPHONE HYDROCHLORIDE 2 MG/ML
1 INJECTION INTRAMUSCULAR; INTRAVENOUS; SUBCUTANEOUS
Refills: 0 | Status: DISCONTINUED | OUTPATIENT
Start: 2020-02-09 | End: 2020-02-09

## 2020-02-09 RX ORDER — ACETAMINOPHEN 500 MG
650 TABLET ORAL EVERY 6 HOURS
Refills: 0 | Status: DISCONTINUED | OUTPATIENT
Start: 2020-02-09 | End: 2020-02-10

## 2020-02-09 RX ADMIN — AMPICILLIN SODIUM AND SULBACTAM SODIUM 200 GRAM(S): 250; 125 INJECTION, POWDER, FOR SUSPENSION INTRAMUSCULAR; INTRAVENOUS at 00:05

## 2020-02-09 RX ADMIN — Medication 650 MILLIGRAM(S): at 18:17

## 2020-02-09 RX ADMIN — HEPARIN SODIUM 5000 UNIT(S): 5000 INJECTION INTRAVENOUS; SUBCUTANEOUS at 05:41

## 2020-02-09 RX ADMIN — Medication 650 MILLIGRAM(S): at 17:47

## 2020-02-09 RX ADMIN — Medication 650 MILLIGRAM(S): at 23:16

## 2020-02-09 RX ADMIN — ENOXAPARIN SODIUM 40 MILLIGRAM(S): 100 INJECTION SUBCUTANEOUS at 15:32

## 2020-02-09 RX ADMIN — SODIUM CHLORIDE 100 MILLILITER(S): 9 INJECTION, SOLUTION INTRAVENOUS at 12:14

## 2020-02-09 RX ADMIN — AMPICILLIN SODIUM AND SULBACTAM SODIUM 200 GRAM(S): 250; 125 INJECTION, POWDER, FOR SUSPENSION INTRAMUSCULAR; INTRAVENOUS at 05:40

## 2020-02-09 RX ADMIN — MORPHINE SULFATE 2 MILLIGRAM(S): 50 CAPSULE, EXTENDED RELEASE ORAL at 08:31

## 2020-02-09 RX ADMIN — OXYCODONE AND ACETAMINOPHEN 1 TABLET(S): 5; 325 TABLET ORAL at 16:09

## 2020-02-09 RX ADMIN — SODIUM CHLORIDE 100 MILLILITER(S): 9 INJECTION INTRAMUSCULAR; INTRAVENOUS; SUBCUTANEOUS at 05:43

## 2020-02-09 RX ADMIN — OXYCODONE AND ACETAMINOPHEN 1 TABLET(S): 5; 325 TABLET ORAL at 15:39

## 2020-02-09 NOTE — DISCHARGE NOTE NURSING/CASE MANAGEMENT/SOCIAL WORK - PATIENT PORTAL LINK FT
You can access the FollowMyHealth Patient Portal offered by [facility] by registering at the following website: [email or web address]. By joining [facility]’s FollowMyHealth portal, you will also be able to view your health information using other applications (apps) compatible with our system.

## 2020-02-09 NOTE — CHART NOTE - NSCHARTNOTEFT_GEN_A_CORE
Post Operative Check     ID # (Polish): [id number]    Patient is post op from a Laparoscopic cholecystectomy, no intraoperative or postoperative complications. His pain is well controlled, endorses "soreness" in the umbilical region. He is tolerating a regular diet without nausea or vomiting. He is voiding spontaneously.     Vitals    T(C): 36.8 (20 @ 13:45), Max: 36.8 (20 @ 13:45)  HR: 71 (20 @ 13:45) (58 - 89)  BP: 134/70 (20 @ 13:45) (126/58 - 140/87)  RR: 18 (20 @ 13:45) (10 - 23)  SpO2: 97% (20 @ 13:45) (95% - 99%)       07:01  -   @ 07:00  --------------------------------------------------------  IN:    sodium chloride 0.9%: 700 mL  Total IN: 700 mL    OUT:    Voided: 500 mL  Total OUT: 500 mL    Total NET: 200 mL       @ 07:01  -   @ 16:32  --------------------------------------------------------  IN:    lactated ringers.: 100 mL    Oral Fluid: 100 mL  Total IN: 200 mL    OUT:  Total OUT: 0 mL    Total NET: 200 mL    Physical Exam  General: NAD AAOx3   Cards: RRR S1S2  Resp: CTAB  Abdomen: mild tenderness in umbilical region, abdomen soft and nondistended. No bleeding, drainage, swelling, or erythema at laparoscopic incision sites.   Ext: NTBL    Labs                        13.8   5.85  )-----------( 222      ( 2020 22:25 )             40.1       Auto Neutrophil %: 39.3 % (20 @ 22:25)  Auto Immature Granulocyte %: 0.2 % (20 @ 22:25)        140  |  102  |  8<L>  ----------------------------<  111<H>  4.2   |  26  |  0.5<L>      Calcium, Total Serum: 9.0 mg/dL (20 @ 22:25)    LFTs:             6.8  | 0.9  | 30       ------------------[71      ( 2020 22:25 )  4.2  | 0.2  | 47          Lactate, Blood: 1.2 mmol/L (20 @ 18:45)    Coags:     12.10  ----< 1.05    ( 2020 23:07 )     36.1      Urinalysis Basic - ( 2020 20:49 )    Color: Yellow / Appearance: Clear / S.020 / pH: x  Gluc: x / Ketone: Trace  / Bili: Negative / Urobili: 0.2   Blood: x / Protein: Trace / Nitrite: Negative   Leuk Esterase: Negative / RBC: x / WBC x   Sq Epi: x / Non Sq Epi: x / Bacteria: Few      Patient is a 44y old Male s/p laparoscopic cholecystectomy     Plan:  -Discharge home   -Continue regular diet  -Adequate pain control  -Follow-up as outpatient in two weeks

## 2020-02-09 NOTE — DISCHARGE NOTE PROVIDER - CARE PROVIDERS DIRECT ADDRESSES
[email or web address]

## 2020-02-09 NOTE — PROGRESS NOTE ADULT - SUBJECTIVE AND OBJECTIVE BOX
GENERAL SURGERY PROGRESS NOTE     SAMIR DOE  [address] [place] [address] day :1d  POD:  Procedure:   Surgical Attending: Mary Grace Hung  Overnight events: Patient restless overnight for surgery. Still with complaint of abdominal pain. NPO without nausea or vomiting.    T(F): 97.1 (20 @ 15:15), Max: 97.1 (20 @ 06:00)  HR: 58 (20 @ 15:15) (57 - 68)  BP: 130/61 (20 @ 15:15) (130/61 - 131/89)  ABP: --  ABP(mean): --  RR: 18 (20 @ 15:15) (18 - 18)  SpO2: 99% (20 @ 08:00) (99% - 99%)      20 @ 07:01  -  20 @ 07:00  --------------------------------------------------------  IN:    sodium chloride 0.9%.: 100 mL  Total IN: 100 mL    OUT:  Total OUT: 0 mL    Total NET: 100 mL      20 @ 07:01  -  20 @ 02:46  --------------------------------------------------------  IN:    sodium chloride 0.9%.: 700 mL  Total IN: 700 mL    OUT:    Voided: 500 mL  Total OUT: 500 mL    Total NET: 200 mL        DIET/FLUIDS: sodium chloride 0.9%. 1000 milliLiter(s) IV Continuous <Continuous>      GI proph:  pantoprazole  Injectable 40 milliGRAM(s) IV Push daily    AC/ proph: heparin  Injectable 5000 Unit(s) SubCutaneous every 8 hours    ABx: ampicillin/sulbactam  IVPB      ampicillin/sulbactam  IVPB 3 Gram(s) IV Intermittent every 6 hours      PHYSICAL EXAM:  GENERAL: NAD, well-appearing  CHEST/LUNG: Clear to auscultation bilaterally  HEART: Regular rate and rhythm  ABDOMEN: Soft, RUQ tenderness, + voluntary guarding, Nondistended;   EXTREMITIES:  No clubbing, cyanosis, or edema      LABS  Labs:  CAPILLARY BLOOD GLUCOSE                              13.8   5.85  )-----------( 222      ( 2020 22:25 )             40.1       Auto Neutrophil %: 39.3 % (20 @ 22:25)  Auto Immature Granulocyte %: 0.2 % (20 @ 22:25)  Auto Immature Granulocyte %: 0.2 % (20 @ 09:29)  Auto Neutrophil %: 61.3 % (20 @ 09:29)        140  |  102  |  8<L>  ----------------------------<  111<H>  4.2   |  26  |  0.5<L>      Calcium, Total Serum: 9.0 mg/dL (20 @ 22:25)      LFTs:             6.8  | 0.9  | 30       ------------------[71      ( 2020 22:25 )  4.2  | 0.2  | 47          Lipase:x      Amylase:x         Lactate, Blood: 1.2 mmol/L (20 @ 18:45)      Coags:     12.10  ----< 1.05    ( 2020 23:07 )     36.1          Urinalysis Basic - ( 2020 20:49 )    Color: Yellow / Appearance: Clear / S.020 / pH: x  Gluc: x / Ketone: Trace  / Bili: Negative / Urobili: 0.2   Blood: x / Protein: Trace / Nitrite: Negative   Leuk Esterase: Negative / RBC: x / WBC x   Sq Epi: x / Non Sq Epi: x / Bacteria: Few

## 2020-02-09 NOTE — DISCHARGE NOTE PROVIDER - CARE PROVIDER_API CALL
Karolina Cohen)  [facility]  [address] [place] [address] [address] [place] [address]  Phone: [phone]  Fax: [phone]  Follow Up Time: 2 weeks Mary Grace Hung)  Surgery  [address] [place] [address]  Phone: [phone]  Fax: [phone]  Follow Up Time:

## 2020-02-09 NOTE — PRE-ANESTHESIA EVALUATION ADULT - NSANTHOSAYNRD_GEN_A_CORE
No. TAD screening performed.  STOP BANG Legend: 0-2 = LOW Risk; 3-4 = INTERMEDIATE Risk; 5-8 = HIGH Risk

## 2020-02-09 NOTE — DISCHARGE NOTE PROVIDER - NSDCCPCAREPLAN_GEN_ALL_CORE_FT
PRINCIPAL DISCHARGE DIAGNOSIS  Diagnosis: Acute cholecystitis  Assessment and Plan of Treatment: PRINCIPAL DISCHARGE DIAGNOSIS  Diagnosis: Acute cholecystitis  Assessment and Plan of Treatment: Activity: No heavy lifting greater than 15 lbs for the next 4-6 weeks.   Pain control: You can take over-the-counter Tylenol and Ibuprofen for pain every 8 hours with meals for 3 days. For severe pain, you may take oxycodone that has been sent to your pharmacy.   Diet: No restrictions.  Follow up: You have an appointment on February 18th to follow up with Dr. Hung.  The office will call you with a time for your appointment.

## 2020-02-09 NOTE — DISCHARGE NOTE PROVIDER - HOSPITAL COURSE
44M, PMHx of left sided thoracotomy, presents to ED w/ abdominal pain. Pt states he has been experiencing RUQ abdominal pain for the past 4 days. Pt admits to nausea and vomiting starting today. Admits to chills. Pt denies chills, dysuria, constipation, or diarrhea. WBC 11. CTAP significant for inflammatory changes of distended gallbladder, suggestive of acute cholecystitis. Patient went to the OR on 2/9/20 for laparoscopic cholecystectomy. No intraoperative or postoperative complications. He is tolerating a regular diet, he is voiding spontaneously, and his pain is well-controlled. He is stable and ready for discharge home with follow-up in Dr. Cohen's office. 45 y/o M, PMHx of left sided thoracotomy, presents to ED w/ abdominal pain. Pt states he has been experiencing RUQ abdominal pain for the past 4 days. Pt admits to nausea and vomiting starting today. Admits to chills. Pt denies chills, dysuria, constipation, or diarrhea. WBC 11. CTAP significant for inflammatory changes of distended gallbladder, suggestive of acute cholecystitis. Patient went to the OR on 2/9/20 for laparoscopic cholecystectomy. No intraoperative or postoperative complications.  Patient was intended for discharge on 2/9 but had new onset tachycardia to 114 and O2 sat of 93-94%.  An ABG was obtained which showed normal pH and pCO2 but decreased pO2. Patient was kept overnight for observation. Vitals have remained stable. He is tolerating a regular diet, he is voiding spontaneously, and his pain is well-controlled. He is stable and ready for discharge home with follow-up in Dr. Hung's office on February 18th.  The office will call you with the time of your appointment.

## 2020-02-09 NOTE — CHART NOTE - NSCHARTNOTEFT_GEN_A_CORE
PACU ANESTHESIA ADMISSION NOTE      Procedure: Laparoscopic cholecystectomy    Post op diagnosis:  Acute cholecystitis      ____  Intubated  TV:______       Rate: ______      FiO2: ______    _x___  Patent Airway    _x___  Full return of protective reflexes    _x___  Full recovery from anesthesia / back to baseline status    Vitals:            T:  97.8              BP :140/87                R:18              Sat: 99              P:83      Mental Status:  _x___ Awake   _____ Alert   _____ Drowsy   _____ Sedated    Nausea/Vomiting:  _x___  NO       ______Yes,   See Post - Op Orders         Pain Scale (0-10):  __0___    Treatment: _x___ None    ____ See Post - Op/PCA Orders    Post - Operative Fluids:   __x__ Oral   ____ See Post - Op Orders    Plan: Discharge:   ____Home       ___x__Floor     _____Critical Care    _____  Other:_________________    Comments:  No anesthesia issues or complications noted.  Discharge when criteria met.

## 2020-02-09 NOTE — DISCHARGE NOTE PROVIDER - NSDCFUADDINST_GEN_ALL_CORE_FT
You are being discharged from [facility]. Please call the phone number provided and schedule a follow-up appointment with Dr. Cohen in 2 weeks. You have been prescribed oxycodone for pain relief, please only take as needed and as prescribed. Please avoid heavy weight lifting for the next 4-6 weeks.  If you have any further questions about your care, please do not hesitate to contact Dr. Cohen's office. You are being discharged from [facility]. You have been prescribed oxycodone for pain relief, please only take as needed and as prescribed. If you have any further questions about your care, please do not hesitate to contact Dr. Hung's office.    Please follow up with your PCP for findings on your RUQ US.  "Increased hepatic echogenicity, consistent with hepatic steatosis or hepatocellular disease.    Please follow up with your PCP and/or cardiologist for findings on your EKG.   "Normal sinus rhythm. Right bundle branch block. Left anterior fascicular block. *** Bifascicular block ***. Moderate voltage criteria for LVH, may be normal variant. Abnormal ECG"

## 2020-02-09 NOTE — PROGRESS NOTE ADULT - ASSESSMENT
44M, PMHx Left sided thoracotomy, presents w/ 4 days of RUQ abdominal pain, associated w/ 1 day of nausea/vomiting, +chills, -fevers. US showed distended gallbladder, sludge w/ trace pericholecystic fluid. CTAP showed inflammatory changes of distended gallbladder, suggestive of acute cholecystitis. Pt is TTP over RUQ      - Added on for OR for laparoscopic cholecystectomy, possible open   - Consent obtained, in chart   - NPO   - IVF   - IV ABX : Unasyn   - Pain control

## 2020-02-09 NOTE — DISCHARGE NOTE PROVIDER - NSDCQMCOGNITION_NEU_ALL_CORE
Refill passed per [facility] protocol.   Refill Protocol Appointment Criteria  · Appointment scheduled in the past 6 months or in the next 3 months  Recent Outpatient Visits            3 months ago Mixed incontinence    [facility], [address] [facility]
No difficulties

## 2020-02-10 VITALS
SYSTOLIC BLOOD PRESSURE: 131 MMHG | DIASTOLIC BLOOD PRESSURE: 61 MMHG | RESPIRATION RATE: 18 BRPM | HEART RATE: 71 BPM | OXYGEN SATURATION: 96 % | TEMPERATURE: 98 F

## 2020-02-10 PROBLEM — Z00.00 ENCOUNTER FOR PREVENTIVE HEALTH EXAMINATION: Status: ACTIVE | Noted: 2020-02-10

## 2020-02-10 PROCEDURE — 71045 X-RAY EXAM CHEST 1 VIEW: CPT | Mod: 26

## 2020-02-10 PROCEDURE — 99024 POSTOP FOLLOW-UP VISIT: CPT

## 2020-02-10 RX ADMIN — Medication 650 MILLIGRAM(S): at 05:13

## 2020-02-10 NOTE — PROGRESS NOTE ADULT - ASSESSMENT
44M, PMHx Left sided thoracotomy, presents w/ 4 days of RUQ abdominal pain, associated w/ 1 day of nausea/vomiting, +chills, -fevers. US showed distended gallbladder, sludge w/ trace pericholecystic fluid. CTAP showed inflammatory changes of distended gallbladder, suggestive of acute cholecystitis. Patient is POD 1 from laparoscopic cholecystectomy, kept overnight for observation due to tachycardia new onset yesterday evening.     Plan:  -Repeat ABG this AM    -Monitor vitals and HR  -Likely discharge this AM   -Follow-up as outpatient

## 2020-02-10 NOTE — PROGRESS NOTE ADULT - SUBJECTIVE AND OBJECTIVE BOX
GENERAL SURGERY PROGRESS NOTE     NADER BAILEY  44y  Male  Hospital day: 3  POD: 1  Procedure: Laparoscopic cholecystectomy    OVERNIGHT EVENTS: Patient was pending discharge yesterday evening when last set of vitals were taken, . EKG obtained showing normal sinus rhythm with HR of 87 and O2 saturation of 93-94%. ABG obtained showing normal pH and PCO2 with decreased PO2. Patient was kept overnight for observation, no chest pain complaints. Most recent HR 98, O4 saturation 94%.     T(F): 98.5 (02-10-20 @ 00:31), Max: 99.3 (02-09-20 @ 20:44)  HR: 98 (02-10-20 @ 00:31) (58 - 104)  BP: 141/63 (02-10-20 @ 00:31) (126/58 - 141/63)  RR: 18 (02-10-20 @ 00:31) (10 - 23)  SpO2: 94% (02-09-20 @ 20:44) (94% - 99%)    GI proph:  famotidine    Tablet 20 milliGRAM(s) Oral every 12 hours PRN    PHYSICAL EXAM:  GENERAL: NAD  CHEST/LUNG: Clear to auscultation bilaterally  HEART: Regular rate and rhythm  ABDOMEN: Soft, Nontender, Nondistended  EXTREMITIES:  No clubbing, cyanosis, or edema    LABS                        13.8   5.85  )-----------( 222      ( 08 Feb 2020 22:25 )             40.1         02-08    140  |  102  |  8<L>  ----------------------------<  111<H>  4.2   |  26  |  0.5<L>    LFTs:             6.8  | 0.9  | 30       ------------------[71      ( 08 Feb 2020 22:25 )  4.2  | 0.2  | 47          Blood Gas Arterial, Lactate: 1.0 mmoL/L (02-09-20 @ 20:59)  Lactate, Blood: 1.2 mmol/L (02-07-20 @ 18:45)    ABG - ( 09 Feb 2020 20:59 )  pH: 7.41  /  pCO2: 42    /  pO2: 62    / HCO3: 26    / Base Excess: 1.5   /  SaO2: 92        RADIOLOGY & ADDITIONAL TESTS:  < from: Xray Chest 1 View AP/PA (02.08.20 @ 00:34) >  Impression:      Blunting of the left CP angle which may represent scarring and/or a small pleural effusion.

## 2020-02-10 NOTE — PROGRESS NOTE ADULT - ATTENDING COMMENTS
45yo male POD#1 s/p laparoscopic cholecystectomy for acute cholecystitis. Doing well. Tolerating diet, pain controlled. Episode of desaturation to 94% on RA, CXR negative. Also tachycardia to 110's, which resolved. EKG negative. Discharge home with f/u in office.

## 2020-02-13 LAB — SURGICAL PATHOLOGY STUDY: SIGNIFICANT CHANGE UP

## 2020-02-14 DIAGNOSIS — R00.0 TACHYCARDIA, UNSPECIFIED: ICD-10-CM

## 2020-02-14 DIAGNOSIS — K81.0 ACUTE CHOLECYSTITIS: ICD-10-CM

## 2020-02-14 DIAGNOSIS — I45.10 UNSPECIFIED RIGHT BUNDLE-BRANCH BLOCK: ICD-10-CM

## 2020-02-14 DIAGNOSIS — E66.9 OBESITY, UNSPECIFIED: ICD-10-CM

## 2020-02-14 DIAGNOSIS — Z88.6 ALLERGY STATUS TO ANALGESIC AGENT: ICD-10-CM

## 2020-02-14 DIAGNOSIS — K66.0 PERITONEAL ADHESIONS (POSTPROCEDURAL) (POSTINFECTION): ICD-10-CM

## 2020-02-18 ENCOUNTER — APPOINTMENT (OUTPATIENT)
Dept: SURGERY | Facility: CLINIC | Age: 45
End: 2020-02-18
Payer: SUBSIDIZED

## 2020-02-18 VITALS
DIASTOLIC BLOOD PRESSURE: 92 MMHG | BODY MASS INDEX: 37.3 KG/M2 | OXYGEN SATURATION: 98 % | HEIGHT: 60 IN | WEIGHT: 190 LBS | HEART RATE: 76 BPM | SYSTOLIC BLOOD PRESSURE: 140 MMHG

## 2020-02-18 DIAGNOSIS — Z78.9 OTHER SPECIFIED HEALTH STATUS: ICD-10-CM

## 2020-02-18 DIAGNOSIS — Z48.89 ENCOUNTER FOR OTHER SPECIFIED SURGICAL AFTERCARE: ICD-10-CM

## 2020-02-18 PROCEDURE — 99024 POSTOP FOLLOW-UP VISIT: CPT

## 2020-02-21 PROBLEM — Z48.89 POSTOPERATIVE VISIT: Status: ACTIVE | Noted: 2020-02-18

## 2020-02-21 PROBLEM — Z78.9 NON-SMOKER: Status: ACTIVE | Noted: 2020-02-18

## 2020-02-21 NOTE — REASON FOR VISIT
[Post Hospitalization] : a post hospitalization visit [[employer] ] : provided by [employer]   [FreeTextEntry1] :  CANDE GOODWIN on 02/09/2020 [TWNoteComboBox1] : Congolese [FreeTextEntry2] : michael

## 2020-02-21 NOTE — HISTORY OF PRESENT ILLNESS
[de-identified] : This 44-year-old male presents for followup following hospitalization for which he was admitted at [facility] with acute right upper quadrant pain and symptoms and was found to cholelithiasis and underwent laparoscopic cholecystectomy.  [de-identified] : Patient doing well denies any symptoms

## 2020-02-21 NOTE — ASSESSMENT
[FreeTextEntry1] : Satisfactory postoperative course for a cholecystectomy patient asymptomatic he was told  to return to work

## 2020-02-21 NOTE — PHYSICAL EXAM
[de-identified] : Healthy-looking male ambulatory in no discomfort [de-identified] : Abdomen soft nondistended nontender with healing laparoscopic cholecystectomy scars without any evidence of infection or hematoma

## 2020-02-21 NOTE — REVIEW OF SYSTEMS
[Diarrhea] : diarrhea [Negative] : Endocrine [Vomiting] : no vomiting [Abdominal Pain] : no abdominal pain [Heartburn] : no heartburn [Constipation] : no constipation [Melena] : no melena

## 2020-07-25 NOTE — H&P ADULT - NSHPPOADEEPVENOUSTHROMB_GEN_A_CORE
Problem: Patient Centered Care  Goal: Patient preferences are identified and integrated in the patient's plan of care  Description  Interventions:  - What would you like us to know as we care for you?  \" I go to a counselor and psychiatrist for my drinki Encourage food from home; allow for food preferences  - Enhance eating environment  Outcome: Progressing     Problem: METABOLIC/FLUID AND ELECTROLYTES - ADULT  Goal: Electrolytes maintained within normal limits  Description  INTERVENTIONS:  - Monitor labs changing drug-related behavior  Description  INTERVENTIONS:  - Administer medication as ordered  - Monitor physical status  - Provide emotional support with 1:1 interaction with staff  - Encourage 12-Step involvement or recovery focused alternative  Outcom no

## 2024-02-03 NOTE — H&P ADULT - ATTENDING COMMENTS
Patient seen and examined with surgery team on rounds and discussed management plans with team.  Localized tenderness with local guarding RUQ. Sono and CT scan reviewed distended Gb with sludge. Normal LFT. started on antibiotics. No

## 2025-02-12 NOTE — ED ADULT NURSE NOTE - CHIEF COMPLAINT
The patient is a 44y Male complaining of abdominal pain. Quality 130: Documentation Of Current Medications In The Medical Record: Current Medications Documented Quality 226: Preventive Care And Screening: Tobacco Use: Screening And Cessation Intervention: Patient screened for tobacco use and is an ex/non-smoker Detail Level: Detailed

## 2025-06-13 NOTE — DISCHARGE NOTE PROVIDER - PROVIDER RX CONTACT NUMBER
FAMILY MEDICINE PROGRESS NOTE      Chief Complaint Office Visit and Hypertension      HPI:    F/u:  Hypothyroxine  TSH done 5/8/25 - TSH 7.504  Free T3/T4 normal  Increased dose of levothyroxine to 75 mcg daily    Heart block - [facility]  Went to [facility] with shortness of breath, fatigue and weakness  Pacemaker placed  Echocardiogram at that time also showed progression from mild bioprosthetic aortic stenosis 6 months ago to at least moderate bioprosthetic aortic stenosis  Saw Dr. Barrientos    Angiogram done 5/12/2025  Cardiology discontinued Amlodipine   TAVR scheduled for 7/1/2025    Review Flowsheet  More data exists       6/13/2025   PHQ 2/9 Score   Adult PHQ 2 Score 0   Adult PHQ 2 Interpretation No further screening needed   Little interest or pleasure in activity? Not at all   Feeling down, depressed or hopeless? Not at all        Medications  Medications were reviewed and updated today.    Current Outpatient Medications   Medication Sig Dispense Refill   • levothyroxine 75 MCG tablet Take 1 tablet by mouth daily. 90 tablet 3   • metFORMIN (GLUCOPHAGE-XR) 500 MG 24 hr tablet Take 500 mg by mouth daily (with breakfast).     • amLODIPine (NORVASC) 5 MG tablet Take 1 tablet by mouth daily. (Patient not taking: Reported on 6/13/2025) 90 tablet 3   • atorvastatin (LIPITOR) 20 MG tablet Take 1 tablet by mouth daily. (Patient not taking: Reported on 6/13/2025) 90 tablet 3   • acetaminophen (TYLENOL) 500 MG tablet Take 500 mg by mouth every 6 hours as needed.     • blood glucose test strip        No current facility-administered medications for this visit.       Histories    Past Medical History:   Diagnosis Date   • Abnormal carotid duplex scan    • Abnormal electrocardiogram    • Adenocarcinoma of prostate  (CMD) 1/10/2017   • Aortic valve disorder    • Atherosclerotic heart disease of native coronary artery without angina pectoris    • Benign essential hypertension    • BPH (benign prostatic hyperplasia)  2/7/2012   • Diabetes mellitus  (CMD)    • History of aortic valve replacement    • History of cardiac catheterization    • History of echocardiogram 8/9/2019    Limited transthoracic echocardiographic study performed 08/24/2015.  LVEF 55%.  LVH.  Normal functioning aortic bioprosthesis.  Mild MR.  Mild LA dilatation.   • History of electrocardiogram    • History of heart surgery    • History of nephrolithiasis    • History of syncope    • Hyperlipidemia    • Left bundle branch block    • Presence of prosthetic heart valve    • Taking medication for chronic disease      Past Surgical History:   Procedure Laterality Date   • Aortic valve replacement     • Hb lithotripsy renal svc fee     • Hernia repair        Family History   Problem Relation Age of Onset   • Diabetes Mother    • Patient is unaware of any medical problems Father    • Cancer, Colon Other    • Cancer, Kidney Other    • Cancer, Prostate Other    • Myocardial Infarction Neg Hx    • Congestive Heart Failure Neg Hx    • Coronary Artery Disease Neg Hx       Social History     Tobacco Use   • Smoking status: Never   • Smokeless tobacco: Never   Vaping Use   • Vaping status: never used   Substance Use Topics   • Alcohol use: Not Currently     Comment: social   • Drug use: Never        Review of Systems:  Review of Systems   Constitutional:  Negative for activity change, chills, diaphoresis, fatigue and fever.   HENT:  Negative for ear pain, hearing loss, mouth sores, postnasal drip, rhinorrhea, sore throat and trouble swallowing.    Eyes:  Negative for pain, discharge and visual disturbance.   Respiratory:  Negative for cough, shortness of breath, wheezing and stridor.    Cardiovascular:  Negative for chest pain, palpitations and leg swelling.   Gastrointestinal:  Negative for abdominal distention, abdominal pain, blood in stool, constipation, diarrhea, nausea and vomiting.   Endocrine: Negative for cold intolerance, heat intolerance, polydipsia, polyphagia  and polyuria.   Genitourinary:  Negative for dysuria, frequency, hematuria and urgency.   Skin: Negative.    Neurological:  Negative for dizziness, weakness and headaches.   Psychiatric/Behavioral: Negative.           Visit Vitals  /61 (BP Location: LUE - Left upper extremity, Patient Position: Sitting, Cuff Size: Regular)   Pulse 71   Temp 98 °F (36.7 °C) (Oral)   Resp 16   Ht 5' 3\" (1.6 m)   Wt 63.3 kg (139 lb 10.6 oz)   SpO2 98%   BMI 24.74 kg/m²     Physical Exam  Physical Exam  Vitals and nursing note reviewed.   Constitutional:       General: He is not in acute distress.  HENT:      Head: Normocephalic and atraumatic.      Right Ear: Tympanic membrane and external ear normal.      Left Ear: Tympanic membrane and external ear normal.      Nose: Nose normal.      Mouth/Throat:      Mouth: Mucous membranes are moist.      Pharynx: Oropharynx is clear.      Neck: Normal range of motion and neck supple.   Eyes:      Extraocular Movements: Extraocular movements intact.      Conjunctiva/sclera: Conjunctivae normal.      Pupils: Pupils are equal, round, and reactive to light.   Neck:      Vascular: No carotid bruit.   Cardiovascular:      Rate and Rhythm: Normal rate and regular rhythm.      Pulses: Normal pulses.      Heart sounds: No murmur heard.  Pulmonary:      Effort: Pulmonary effort is normal. No respiratory distress.      Breath sounds: Normal breath sounds. No wheezing, rhonchi or rales.   Abdominal:      General: Bowel sounds are normal. There is no distension.      Palpations: Abdomen is soft. There is no mass.      Tenderness: There is no abdominal tenderness. There is no guarding or rebound.   Musculoskeletal:         General: Normal range of motion.      Right lower leg: No edema.      Left lower leg: No edema.   Lymphadenopathy:      Cervical: No cervical adenopathy.   Skin:     General: Skin is warm and dry.      Capillary Refill: Capillary refill takes less than 2 seconds.      Findings: No  rash.   Neurological:      General: No focal deficit present.      Mental Status: He is alert and oriented to person, place, and time.      Motor: No weakness.      Gait: Gait normal.      Deep Tendon Reflexes: Reflexes normal.   Psychiatric:         Mood and Affect: Mood normal.         Behavior: Behavior normal.           ASSESSMENT/PLAN:     Problem List Items Addressed This Visit        Cardiac and Vasculature    History of aortic valve replacement    Presence of prosthetic heart valve    Aortic valve disorder    Scheduled for TAVR in July  Dr. Barrientos, Dr. Holcomb         Atherosclerotic heart disease of native coronary artery without angina pectoris    Stable  Offers no CV symptoms-no chest pain or shortness of breath  Followed by cardiology    Angiogram test done recently         Benign essential hypertension    Not taking amlodipine  Discontinued by cardiology - was getting low         Heart block AV third degree  (CMD)    Monitor: The problem is stable.  Evaluation: Reviewed recent labs/tests with the patient.  Assessment/Treatment:  Continue current treatment/monitoring regimen. and Managed by specialist.    Went to ER at [facility] with fatigue, SOB  Pacemaker placed         Hyperlipidemia    Not taking Atorvastatin            Endocrine and Metabolic    Diabetes mellitus  (CMD)    Monitor: The problem is stable.  Evaluation: Reviewed recent labs/tests with the patient.  Assessment/Treatment:  Continue current treatment/monitoring regimen. and Managed by specialist.         Other specified hypothyroidism - Primary    Last TSH mildly elevated  Dose of levothyroxine increased, started this week    Check TSH next month         Relevant Orders    Thyroid Stimulating Hormone Reflex         Return in about 3 months (around 9/13/2025).    Portions of this note may have been created using the Dragon voice recognition system.  Errors in content may be related to improper recognition of the system.  Effort to review  and correct the note has been made but irregularities may still be present.    Vinay Eastman MD    [phone]